# Patient Record
Sex: MALE | URBAN - METROPOLITAN AREA
[De-identification: names, ages, dates, MRNs, and addresses within clinical notes are randomized per-mention and may not be internally consistent; named-entity substitution may affect disease eponyms.]

---

## 2017-05-28 ENCOUNTER — EMERGENCY (EMERGENCY)
Facility: HOSPITAL | Age: 22
LOS: 0 days | Discharge: HOME | End: 2017-05-28
Attending: EMERGENCY MEDICINE | Admitting: EMERGENCY MEDICINE
Payer: COMMERCIAL

## 2017-05-28 VITALS
HEIGHT: 71 IN | RESPIRATION RATE: 15 BRPM | HEART RATE: 78 BPM | SYSTOLIC BLOOD PRESSURE: 125 MMHG | OXYGEN SATURATION: 100 % | WEIGHT: 250 LBS | TEMPERATURE: 98 F | DIASTOLIC BLOOD PRESSURE: 72 MMHG

## 2017-05-28 DIAGNOSIS — M72.2 PLANTAR FASCIAL FIBROMATOSIS: ICD-10-CM

## 2017-05-28 DIAGNOSIS — M79.672 PAIN IN LEFT FOOT: ICD-10-CM

## 2017-05-28 PROCEDURE — 99283 EMERGENCY DEPT VISIT LOW MDM: CPT

## 2017-05-28 NOTE — ED STATDOCS - OBJECTIVE STATEMENT
21 y/o M presents to ED for evaluation of left foot pain. Pt states pain began 2 weeks ago but he does not know what caused it. 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 Pt states he runs 5 miles daily. No other complaints.

## 2017-05-28 NOTE — ED STATDOCS - MEDICAL DECISION MAKING DETAILS
Sx indicative of plantar fascitis, advised pt to rest from running and stretch daily to improve Sx. Recommend podiatry f/u, Will d/c home

## 2017-05-28 NOTE — ED STATDOCS - MUSCULOSKELETAL, MLM
range of motion is not limited, minimal tenderness over sole of left foot with no edema or erythema.

## 2017-05-28 NOTE — ED STATDOCS - NS ED MD SCRIBE ATTENDING SCRIBE SECTIONS
REVIEW OF SYSTEMS/VITAL SIGNS( Pullset)/RESULTS/PHYSICAL EXAM/HISTORY OF PRESENT ILLNESS/PROGRESS NOTE/DISPOSITION/PAST MEDICAL/SURGICAL/SOCIAL HISTORY